# Patient Record
Sex: MALE | Race: WHITE | HISPANIC OR LATINO | Employment: UNEMPLOYED | URBAN - METROPOLITAN AREA
[De-identification: names, ages, dates, MRNs, and addresses within clinical notes are randomized per-mention and may not be internally consistent; named-entity substitution may affect disease eponyms.]

---

## 2023-07-16 ENCOUNTER — HOSPITAL ENCOUNTER (EMERGENCY)
Facility: HOSPITAL | Age: 28
Discharge: HOME | End: 2023-07-16
Attending: EMERGENCY MEDICINE

## 2023-07-16 VITALS
OXYGEN SATURATION: 99 % | HEART RATE: 95 BPM | DIASTOLIC BLOOD PRESSURE: 80 MMHG | WEIGHT: 155 LBS | TEMPERATURE: 98 F | BODY MASS INDEX: 19.89 KG/M2 | HEIGHT: 74 IN | SYSTOLIC BLOOD PRESSURE: 130 MMHG | RESPIRATION RATE: 18 BRPM

## 2023-07-16 DIAGNOSIS — M79.672 LEFT FOOT PAIN: ICD-10-CM

## 2023-07-16 DIAGNOSIS — S90.30XA CONTUSION OF FOOT, UNSPECIFIED LATERALITY, INITIAL ENCOUNTER: Primary | ICD-10-CM

## 2023-07-16 DIAGNOSIS — M79.671 RIGHT FOOT PAIN: ICD-10-CM

## 2023-07-16 PROCEDURE — 96372 THER/PROPH/DIAG INJ SC/IM: CPT | Performed by: PHYSICIAN ASSISTANT

## 2023-07-16 PROCEDURE — 25000003 PHARM REV CODE 250: Performed by: PHYSICIAN ASSISTANT

## 2023-07-16 PROCEDURE — 63600175 PHARM REV CODE 636 W HCPCS: Performed by: PHYSICIAN ASSISTANT

## 2023-07-16 PROCEDURE — 99284 EMERGENCY DEPT VISIT MOD MDM: CPT

## 2023-07-16 RX ORDER — NAPROXEN 500 MG/1
500 TABLET ORAL 2 TIMES DAILY
Qty: 30 TABLET | Refills: 0 | Status: SHIPPED | OUTPATIENT
Start: 2023-07-16

## 2023-07-16 RX ORDER — HYDROCODONE BITARTRATE AND ACETAMINOPHEN 10; 325 MG/1; MG/1
1 TABLET ORAL
Status: COMPLETED | OUTPATIENT
Start: 2023-07-16 | End: 2023-07-16

## 2023-07-16 RX ORDER — KETOROLAC TROMETHAMINE 30 MG/ML
15 INJECTION, SOLUTION INTRAMUSCULAR; INTRAVENOUS
Status: COMPLETED | OUTPATIENT
Start: 2023-07-16 | End: 2023-07-16

## 2023-07-16 RX ADMIN — HYDROCODONE BITARTRATE AND ACETAMINOPHEN 1 TABLET: 10; 325 TABLET ORAL at 11:07

## 2023-07-16 RX ADMIN — KETOROLAC TROMETHAMINE 15 MG: 30 INJECTION, SOLUTION INTRAMUSCULAR; INTRAVENOUS at 11:07

## 2023-07-16 NOTE — ED PROVIDER NOTES
Encounter Date: 7/16/2023       History     Chief Complaint   Patient presents with    Foot Pain     Pt states he jumped a 12 foot fence and landed on left foot/ heal pain,  + 2 DP pulse, no deformity noted, denies any additional injuries/pain + abrasion noted to right hand      28-year-old male presents to ED with concern of bilateral foot pain, left more than right, that began late yesterday evening after he jumped over a fence and landed on bilateral feet.  No head injury.  No LOC. He has continued have severe pain primarily to his left foot described as sharp, worse with weight-bearing, nonradiating, severity 9/10.  Denying numbness or focal weakness.  He also notes having abrasions to right upper extremity but denying significant pain symptoms.  No other acute complaints at this time.    The history is provided by the patient.   Review of patient's allergies indicates:  No Known Allergies  History reviewed. No pertinent past medical history.  No past surgical history on file.  No family history on file.     Review of Systems   Musculoskeletal:  Positive for arthralgias and gait problem.   Skin:  Positive for wound. Negative for color change.   Neurological:  Negative for weakness and numbness.     Physical Exam     Initial Vitals [07/16/23 1040]   BP Pulse Resp Temp SpO2   133/83 96 18 98.1 °F (36.7 °C) 100 %      MAP       --         Physical Exam    Nursing note and vitals reviewed.  Constitutional: He appears well-developed and well-nourished. He is active. He does not have a sickly appearance. He does not appear ill. No distress.   HENT:   Head: Normocephalic and atraumatic.   Neck:   Normal range of motion.  Musculoskeletal:      Cervical back: Normal range of motion.      Comments: Right mid foot pain over plantar surface near plantar fascia.  No obvious physical or palpable deformities.  No overlying skin changes, bruising or swelling.  Distal sensations intact.  Radial pulse intact.  Full ROM.  Left  foot also noted have tenderness over midfoot region.  No significant swelling.  No bruising.  DP pulse intact.  Sensations intact.  ROM of foot and toes intact.  Denying left ankle tenderness.  Negative Song test.     Neurological: He is alert. GCS eye subscore is 4. GCS verbal subscore is 5. GCS motor subscore is 6.   Skin: Skin is warm and dry.   Superficial abrasions to dorsal right hand.  No deep wounds.   Psychiatric: He has a normal mood and affect. His speech is normal and behavior is normal.       ED Course   Procedures  Labs Reviewed - No data to display       Imaging Results              X-Ray Foot Complete Bilateral (Final result)  Result time 07/16/23 11:30:40      Final result by Tami Pierson MD (07/16/23 11:30:40)                   Impression:      As above.      Electronically signed by: Tami Pierson MD  Date:    07/16/2023  Time:    11:30               Narrative:    EXAMINATION:  XR FOOT COMPLETE 3 VIEW BILATERAL    CLINICAL HISTORY:  Pain in left foot    TECHNIQUE:  AP, lateral, and oblique views of both feet were performed.    COMPARISON:  None    FINDINGS:  No acute fracture or bony destructive process is seen.  Alignment is preserved.  On the oblique image, there are faint calcific densities between the base the proximal phalanges of the 1st and 2nd toes on the left.  These are projected over the soft tissues at the sole of the foot on the lateral image.  These could be on the patient's skin, but small foreign bodies in the soft tissues cannot be completely excluded.  Nothing else to suggest radiopaque foreign bodies in the soft tissues.  No significant hypertrophic degenerative changes.                                       Medications   ketorolac injection 15 mg (15 mg Intramuscular Given 7/16/23 1103)   HYDROcodone-acetaminophen  mg per tablet 1 tablet (1 tablet Oral Given 7/16/23 1130)     Medical Decision Making:   Initial Assessment:   Patient presents with concern of  bilateral foot pain, left worse than right, after jumping off of fence late yesterday evening.  Pain predominantly to left foot with limited ability to weight bear.  No numbness or focal weakness.  Afebrile with vitals WNL.  No bony deformities noted on exam.  Neurovascularly intact.  Differential Diagnosis:   Strain,, sprain, contusion, dislocation, fracture  ED Management:  X-ray bilateral feet    X-ray of bilateral feet per my interpretation showing no acute bony abnormalities or fracture/dislocations.  Images reviewed by Radiology, who agrees with no acute bony abnormalities seen.    Will continue with conservative care.  Ace wrap applied in ED.  Patient supplied with crutches.  Prescription for naproxen.  Encouraged RICE, activities and movements as tolerated with very close PCP follow-up.  ED return precautions discussed.  Patient states his understanding and agrees with plan.                        Clinical Impression:   Final diagnoses:  [M79.672] Left foot pain  [M79.671] Right foot pain  [S90.30XA] Contusion of foot, unspecified laterality, initial encounter (Primary)        ED Disposition Condition    Discharge Stable          ED Prescriptions       Medication Sig Dispense Start Date End Date Auth. Provider    naproxen (NAPROSYN) 500 MG tablet Take 1 tablet (500 mg total) by mouth 2 (two) times daily. 30 tablet 7/16/2023 -- Cullen Noble PA-C          Follow-up Information       Follow up With Specialties Details Why Contact Info    Your Doctor                 Cullen Noble PA-C  07/16/23 8695

## 2023-07-16 NOTE — DISCHARGE INSTRUCTIONS
